# Patient Record
Sex: MALE | Race: WHITE | NOT HISPANIC OR LATINO | Employment: FULL TIME | URBAN - NONMETROPOLITAN AREA
[De-identification: names, ages, dates, MRNs, and addresses within clinical notes are randomized per-mention and may not be internally consistent; named-entity substitution may affect disease eponyms.]

---

## 2024-12-04 DIAGNOSIS — E78.00 HYPERCHOLESTEROLEMIA: ICD-10-CM

## 2024-12-04 RX ORDER — ATORVASTATIN CALCIUM 40 MG/1
40 TABLET, FILM COATED ORAL DAILY
Qty: 30 TABLET | Refills: 0 | Status: SHIPPED | OUTPATIENT
Start: 2024-12-04

## 2024-12-06 DIAGNOSIS — J44.9 CHRONIC OBSTRUCTIVE PULMONARY DISEASE, UNSPECIFIED COPD TYPE (MULTI): ICD-10-CM

## 2024-12-08 RX ORDER — ALBUTEROL SULFATE 90 UG/1
INHALANT RESPIRATORY (INHALATION)
Qty: 27 G | Refills: 0 | Status: SHIPPED | OUTPATIENT
Start: 2024-12-08

## 2024-12-10 ENCOUNTER — APPOINTMENT (OUTPATIENT)
Dept: PRIMARY CARE | Facility: CLINIC | Age: 64
End: 2024-12-10
Payer: COMMERCIAL

## 2024-12-10 VITALS
DIASTOLIC BLOOD PRESSURE: 98 MMHG | BODY MASS INDEX: 30.04 KG/M2 | OXYGEN SATURATION: 95 % | WEIGHT: 197.6 LBS | HEART RATE: 81 BPM | SYSTOLIC BLOOD PRESSURE: 160 MMHG | TEMPERATURE: 97.7 F

## 2024-12-10 DIAGNOSIS — E66.811 CLASS 1 OBESITY DUE TO EXCESS CALORIES WITH SERIOUS COMORBIDITY AND BODY MASS INDEX (BMI) OF 30.0 TO 30.9 IN ADULT: ICD-10-CM

## 2024-12-10 DIAGNOSIS — R41.3 MEMORY LOSS: ICD-10-CM

## 2024-12-10 DIAGNOSIS — E66.09 CLASS 1 OBESITY DUE TO EXCESS CALORIES WITH SERIOUS COMORBIDITY AND BODY MASS INDEX (BMI) OF 30.0 TO 30.9 IN ADULT: ICD-10-CM

## 2024-12-10 DIAGNOSIS — J44.9 CHRONIC OBSTRUCTIVE PULMONARY DISEASE, UNSPECIFIED COPD TYPE (MULTI): ICD-10-CM

## 2024-12-10 DIAGNOSIS — E11.69 TYPE 2 DIABETES MELLITUS WITH OTHER SPECIFIED COMPLICATION, WITHOUT LONG-TERM CURRENT USE OF INSULIN: ICD-10-CM

## 2024-12-10 DIAGNOSIS — R93.89 ABNORMAL CT OF THE CHEST: Primary | ICD-10-CM

## 2024-12-10 DIAGNOSIS — F17.210 CIGARETTE NICOTINE DEPENDENCE WITHOUT COMPLICATION: ICD-10-CM

## 2024-12-10 DIAGNOSIS — I10 PRIMARY HYPERTENSION: ICD-10-CM

## 2024-12-10 DIAGNOSIS — I25.10 CORONARY ARTERY DISEASE INVOLVING NATIVE CORONARY ARTERY OF NATIVE HEART WITHOUT ANGINA PECTORIS: ICD-10-CM

## 2024-12-10 PROCEDURE — 3061F NEG MICROALBUMINURIA REV: CPT | Performed by: INTERNAL MEDICINE

## 2024-12-10 PROCEDURE — 99214 OFFICE O/P EST MOD 30 MIN: CPT | Performed by: INTERNAL MEDICINE

## 2024-12-10 PROCEDURE — 3077F SYST BP >= 140 MM HG: CPT | Performed by: INTERNAL MEDICINE

## 2024-12-10 PROCEDURE — 3080F DIAST BP >= 90 MM HG: CPT | Performed by: INTERNAL MEDICINE

## 2024-12-10 PROCEDURE — 3049F LDL-C 100-129 MG/DL: CPT | Performed by: INTERNAL MEDICINE

## 2024-12-10 PROCEDURE — 3044F HG A1C LEVEL LT 7.0%: CPT | Performed by: INTERNAL MEDICINE

## 2024-12-10 RX ORDER — ALBUTEROL SULFATE 90 UG/1
INHALANT RESPIRATORY (INHALATION)
Qty: 27 G | Refills: 0 | Status: SHIPPED | OUTPATIENT
Start: 2024-12-10

## 2024-12-10 NOTE — PROGRESS NOTES
Subjective   Patient ID: Grant Allen Sr. is a 64 y.o. male who presents for Follow-up (6 weeks ).  HPI    Follow up CT chest    Abnormal CT chest  COPD / Asthma on therapy no side effects   Pulm following  On oxygen at night  2 L nc  nicotine addiction counseled      Nicotine abuse   chantix not approved     Memory loss  MMSE 24/30 mild dementia today  CT 12-23 negative  Neuro pending         shingles / PHN  Pain less off gabapentin     DM type II non-insulin-dependent no side effects  Follow blood sugars closely  HBA1C 6.5  6-24  CKD stage 3a  Eyes recommended  feet on follow up     Nasal polyps, vertigo  ENT consult / not done  Audible breathing sounds     CAD/hypertension stable on therapy no side effects   Working on bp med changes  Cardio following     diet and exercise reviewed    Review of Systems   All other systems reviewed and are negative.      Objective   BP (!) 160/98   Pulse 81   Temp 36.5 °C (97.7 °F)   Wt 89.6 kg (197 lb 9.6 oz)   SpO2 95%   BMI 30.04 kg/m²   Lab Results   Component Value Date    WBC 8.0 06/11/2024    HGB 14.0 06/11/2024    HCT 44.5 06/11/2024     06/11/2024    CHOL 179 06/11/2024    TRIG 105 06/11/2024    HDL 57.3 06/11/2024    ALT 12 06/11/2024    AST 13 06/11/2024     06/11/2024    K 4.6 06/11/2024     06/11/2024    CREATININE 1.33 (H) 06/11/2024    BUN 18 06/11/2024    CO2 27 06/11/2024    TSH 1.61 06/11/2024    PSA 0.57 06/20/2023    INR 1.0 05/21/2021    HGBA1C 6.5 (H) 06/11/2024           Physical Exam  Vitals reviewed.   Constitutional:       Appearance: Normal appearance. He is obese.   HENT:      Head: Normocephalic and atraumatic.      Mouth/Throat:      Pharynx: No posterior oropharyngeal erythema.   Eyes:      General: No scleral icterus.     Conjunctiva/sclera: Conjunctivae normal.      Pupils: Pupils are equal, round, and reactive to light.   Cardiovascular:      Rate and Rhythm: Normal rate and regular rhythm.      Heart sounds: Normal heart  sounds.   Pulmonary:      Effort: No respiratory distress.      Breath sounds: No wheezing.   Abdominal:      General: Abdomen is flat. Bowel sounds are normal. There is no distension.      Palpations: Abdomen is soft. There is no mass.      Tenderness: There is no abdominal tenderness. There is no rebound.   Musculoskeletal:         General: Normal range of motion.      Cervical back: Normal range of motion and neck supple.   Skin:     General: Skin is warm and dry.   Neurological:      General: No focal deficit present.      Mental Status: He is alert and oriented to person, place, and time. Mental status is at baseline.   Psychiatric:         Mood and Affect: Mood normal.         Behavior: Behavior normal.         Thought Content: Thought content normal.         Judgment: Judgment normal.         Problem List Items Addressed This Visit             ICD-10-CM    Nicotine addiction F17.200    COPD (chronic obstructive pulmonary disease) (Multi) J44.9    Relevant Medications    albuterol 90 mcg/actuation inhaler    CAD (coronary artery disease) I25.10    Primary hypertension I10     Other Visit Diagnoses         Codes    Abnormal CT of the chest    -  Primary R93.89    Type 2 diabetes mellitus with other specified complication, without long-term current use of insulin     E11.69    Memory loss     R41.3    Relevant Orders    Referral to Neurology    Class 1 obesity due to excess calories with serious comorbidity and body mass index (BMI) of 30.0 to 30.9 in adult     E66.811, E66.09, Z68.30          Assessment/Plan     Follow up CT chest    Abnormal CT chest  COPD / Asthma on therapy no side effects   Pulm following  On oxygen at night  2 L nc  nicotine addiction counseled      Nicotine abuse   chantix not approved     Memory loss  MMSE 24/30 mild dementia today  CT 12-23 negative  Neuro pending         shingles / PHN  Pain less off gabapentin     DM type II non-insulin-dependent no side effects  Follow blood sugars  closely  HBA1C 6.5  6-24  CKD stage 3a  Eyes recommended  feet on follow up     Nasal polyps, vertigo  ENT consult / not done  Audible breathing sounds     CAD/hypertension stable on therapy no side effects   Working on bp med changes  Better at home  Cardio following  follow     diet and exercise reviewed    Prostate 10-24  Cologuard 2-22 negative  CT chest 11-24   immunizations rev'd pneumo, Shingrix, RSV, COVID 19   / not done  BMI 30    Follow up 3 months

## 2024-12-31 DIAGNOSIS — I10 PRIMARY HYPERTENSION: ICD-10-CM

## 2025-01-02 RX ORDER — METOPROLOL SUCCINATE 50 MG/1
50 TABLET, EXTENDED RELEASE ORAL DAILY
Qty: 90 TABLET | Refills: 0 | Status: SHIPPED | OUTPATIENT
Start: 2025-01-02

## 2025-01-08 DIAGNOSIS — E78.00 HYPERCHOLESTEROLEMIA: ICD-10-CM

## 2025-01-08 RX ORDER — ATORVASTATIN CALCIUM 40 MG/1
40 TABLET, FILM COATED ORAL DAILY
Qty: 90 TABLET | Refills: 0 | Status: SHIPPED | OUTPATIENT
Start: 2025-01-08

## 2025-01-14 DIAGNOSIS — J30.9 ALLERGIC RHINITIS, UNSPECIFIED SEASONALITY, UNSPECIFIED TRIGGER: ICD-10-CM

## 2025-01-14 RX ORDER — MONTELUKAST SODIUM 10 MG/1
10 TABLET ORAL DAILY
Qty: 90 TABLET | Refills: 0 | Status: SHIPPED | OUTPATIENT
Start: 2025-01-14

## 2025-01-28 DIAGNOSIS — J44.9 CHRONIC OBSTRUCTIVE PULMONARY DISEASE, UNSPECIFIED COPD TYPE (MULTI): ICD-10-CM

## 2025-01-29 RX ORDER — ALBUTEROL SULFATE 90 UG/1
INHALANT RESPIRATORY (INHALATION)
Qty: 27 G | Refills: 0 | Status: SHIPPED | OUTPATIENT
Start: 2025-01-29

## 2025-02-01 DIAGNOSIS — F41.9 ANXIETY: ICD-10-CM

## 2025-02-03 RX ORDER — BUPROPION HYDROCHLORIDE 150 MG/1
450 TABLET ORAL DAILY
Qty: 270 TABLET | Refills: 0 | Status: SHIPPED | OUTPATIENT
Start: 2025-02-03

## 2025-02-26 DIAGNOSIS — J44.9 CHRONIC OBSTRUCTIVE PULMONARY DISEASE, UNSPECIFIED COPD TYPE (MULTI): ICD-10-CM

## 2025-02-26 RX ORDER — ALBUTEROL SULFATE 90 UG/1
INHALANT RESPIRATORY (INHALATION)
Qty: 27 G | Refills: 0 | Status: SHIPPED | OUTPATIENT
Start: 2025-02-26

## 2025-03-12 ENCOUNTER — APPOINTMENT (OUTPATIENT)
Dept: PRIMARY CARE | Facility: CLINIC | Age: 65
End: 2025-03-12
Payer: COMMERCIAL

## 2025-03-12 VITALS
HEART RATE: 83 BPM | TEMPERATURE: 97.6 F | OXYGEN SATURATION: 96 % | WEIGHT: 199.4 LBS | DIASTOLIC BLOOD PRESSURE: 86 MMHG | BODY MASS INDEX: 30.32 KG/M2 | SYSTOLIC BLOOD PRESSURE: 150 MMHG

## 2025-03-12 DIAGNOSIS — E11.69 TYPE 2 DIABETES MELLITUS WITH OBESITY (MULTI): Primary | ICD-10-CM

## 2025-03-12 DIAGNOSIS — E66.09 CLASS 1 OBESITY DUE TO EXCESS CALORIES WITH SERIOUS COMORBIDITY AND BODY MASS INDEX (BMI) OF 30.0 TO 30.9 IN ADULT: ICD-10-CM

## 2025-03-12 DIAGNOSIS — E66.9 TYPE 2 DIABETES MELLITUS WITH OBESITY (MULTI): Primary | ICD-10-CM

## 2025-03-12 DIAGNOSIS — I10 PRIMARY HYPERTENSION: ICD-10-CM

## 2025-03-12 DIAGNOSIS — F17.210 CIGARETTE NICOTINE DEPENDENCE WITHOUT COMPLICATION: ICD-10-CM

## 2025-03-12 DIAGNOSIS — I42.8 OTHER CARDIOMYOPATHIES: ICD-10-CM

## 2025-03-12 DIAGNOSIS — E66.811 CLASS 1 OBESITY DUE TO EXCESS CALORIES WITH SERIOUS COMORBIDITY AND BODY MASS INDEX (BMI) OF 30.0 TO 30.9 IN ADULT: ICD-10-CM

## 2025-03-12 DIAGNOSIS — I25.10 CORONARY ARTERY DISEASE INVOLVING NATIVE CORONARY ARTERY OF NATIVE HEART WITHOUT ANGINA PECTORIS: ICD-10-CM

## 2025-03-12 DIAGNOSIS — Z12.11 ENCOUNTER FOR SCREENING FOR MALIGNANT NEOPLASM OF COLON: ICD-10-CM

## 2025-03-12 DIAGNOSIS — J44.9 CHRONIC OBSTRUCTIVE PULMONARY DISEASE, UNSPECIFIED COPD TYPE (MULTI): ICD-10-CM

## 2025-03-12 PROCEDURE — 99214 OFFICE O/P EST MOD 30 MIN: CPT | Performed by: INTERNAL MEDICINE

## 2025-03-12 PROCEDURE — 3079F DIAST BP 80-89 MM HG: CPT | Performed by: INTERNAL MEDICINE

## 2025-03-12 PROCEDURE — 3077F SYST BP >= 140 MM HG: CPT | Performed by: INTERNAL MEDICINE

## 2025-03-12 RX ORDER — ALBUTEROL SULFATE 90 UG/1
INHALANT RESPIRATORY (INHALATION)
Qty: 27 G | Refills: 0 | Status: SHIPPED | OUTPATIENT
Start: 2025-03-12

## 2025-03-12 NOTE — PROGRESS NOTES
Subjective   Patient ID: Grant Allen Sr. is a 64 y.o. male who presents for Follow-up (3 month ).  HPI    Follow up    Excessive eating no weight gain  No other sx  Check labs     Abnormal CT chest  COPD / Asthma on therapy no side effects   Pulm following  On oxygen at night  2 L nc  nicotine addiction counseled      Nicotine abuse   chantix not approved     Memory loss  MMSE 24/30 mild dementia today  CT 12-23 negative  Neuro pending         shingles / PHN  Pain less off gabapentin     DM type II non-insulin-dependent no side effects  Follow blood sugars closely  HBA1C 6.5  6-24  CKD stage 3a  Eyes recommended  feet on follow up     Nasal polyps, vertigo  ENT consult / not done  Audible breathing sounds     CAD/hypertension stable on therapy no side effects   Working on bp med changes  Better at home  Cardio following  follow     diet and exercise reviewed    Review of Systems   All other systems reviewed and are negative.      Objective   /86   Pulse 83   Temp 36.4 °C (97.6 °F)   Wt 90.4 kg (199 lb 6.4 oz)   SpO2 96%   BMI 30.32 kg/m²   Lab Results   Component Value Date    WBC 8.0 06/11/2024    HGB 14.0 06/11/2024    HCT 44.5 06/11/2024     06/11/2024    CHOL 179 06/11/2024    TRIG 105 06/11/2024    HDL 57.3 06/11/2024    ALT 12 06/11/2024    AST 13 06/11/2024     06/11/2024    K 4.6 06/11/2024     06/11/2024    CREATININE 1.33 (H) 06/11/2024    BUN 18 06/11/2024    CO2 27 06/11/2024    TSH 1.61 06/11/2024    PSA 0.57 06/20/2023    INR 1.0 05/21/2021    HGBA1C 6.5 (H) 06/11/2024           Physical Exam  Vitals reviewed.   Constitutional:       Appearance: Normal appearance. He is obese.   HENT:      Head: Normocephalic and atraumatic.      Mouth/Throat:      Pharynx: No posterior oropharyngeal erythema.   Eyes:      General: No scleral icterus.     Conjunctiva/sclera: Conjunctivae normal.      Pupils: Pupils are equal, round, and reactive to light.   Cardiovascular:      Rate and  Rhythm: Normal rate and regular rhythm.      Heart sounds: Normal heart sounds.   Pulmonary:      Effort: No respiratory distress.      Breath sounds: No wheezing.   Abdominal:      General: Abdomen is flat. Bowel sounds are normal. There is no distension.      Palpations: Abdomen is soft. There is no mass.      Tenderness: There is no abdominal tenderness. There is no rebound.   Musculoskeletal:         General: Normal range of motion.      Cervical back: Normal range of motion and neck supple.   Skin:     General: Skin is warm and dry.   Neurological:      General: No focal deficit present.      Mental Status: He is alert and oriented to person, place, and time. Mental status is at baseline.   Psychiatric:         Mood and Affect: Mood normal.         Behavior: Behavior normal.         Thought Content: Thought content normal.         Judgment: Judgment normal.         Problem List Items Addressed This Visit             ICD-10-CM    Nicotine addiction F17.200    COPD (chronic obstructive pulmonary disease) (Multi) J44.9    Relevant Medications    albuterol 90 mcg/actuation inhaler    CAD (coronary artery disease) I25.10    Primary hypertension I10    Other cardiomyopathies I42.8     Other Visit Diagnoses         Codes    Type 2 diabetes mellitus with obesity (Multi)    -  Primary E11.69, E66.9    Relevant Orders    Comprehensive Metabolic Panel    Hemoglobin A1C    CBC and Auto Differential    TSH with reflex to Free T4 if abnormal    Class 1 obesity due to excess calories with serious comorbidity and body mass index (BMI) of 30.0 to 30.9 in adult     E66.811, E66.09, Z68.30    Encounter for screening for malignant neoplasm of colon     Z12.11    Relevant Orders    Cologuard® colon cancer screening          Assessment/Plan     Excessive eating no weight gain  No other sx  Check labs     Abnormal CT chest  COPD / Asthma on therapy no side effects   Pulm following  On oxygen at night  2 L nc  nicotine addiction  counseled      Nicotine abuse   chantix not approved     Memory loss  MMSE 24/30 mild dementia today  CT 12-23 negative  Neuro pending         shingles / PHN  Pain less off gabapentin     DM type II non-insulin-dependent no side effects  Follow blood sugars closely  HBA1C 6.5  6-24  CKD stage 3a  Eyes recommended  feet on follow up     Nasal polyps, vertigo  ENT consult / not done  Audible breathing sounds     CAD/hypertension stable on therapy no side effects   Working on bp med changes  Better at home  Cardio following  follow     diet and exercise reviewed    Prostate 10-24  Cologuard 2-22 negative  CT chest 11-24   immunizations rev'd pneumo, Shingrix, RSV, COVID 19   / not done  BMI 30.3    Check labs, cologuard    Follow up 3 weeks

## 2025-03-13 LAB
ALBUMIN SERPL-MCNC: 4.4 G/DL (ref 3.6–5.1)
ALBUMIN/GLOB SERPL: 1.9 (CALC) (ref 1–2.5)
ALP SERPL-CCNC: 73 U/L (ref 35–144)
ALT SERPL-CCNC: 14 U/L (ref 9–46)
AST SERPL-CCNC: 13 U/L (ref 10–35)
BASOPHILS # BLD AUTO: 78 CELLS/UL (ref 0–200)
BASOPHILS NFR BLD AUTO: 0.8 %
BILIRUB SERPL-MCNC: 0.4 MG/DL (ref 0.2–1.2)
BUN SERPL-MCNC: 25 MG/DL (ref 7–25)
BUN/CREAT SERPL: 16 (CALC) (ref 6–22)
CALCIUM SERPL-MCNC: 9.7 MG/DL (ref 8.6–10.3)
CHLORIDE SERPL-SCNC: 104 MMOL/L (ref 98–110)
CO2 SERPL-SCNC: 28 MMOL/L (ref 20–32)
CREAT SERPL-MCNC: 1.52 MG/DL (ref 0.7–1.35)
EGFRCR SERPLBLD CKD-EPI 2021: 51 ML/MIN/1.73M2
EOSINOPHIL # BLD AUTO: 815 CELLS/UL (ref 15–500)
EOSINOPHIL NFR BLD AUTO: 8.4 %
ERYTHROCYTE [DISTWIDTH] IN BLOOD BY AUTOMATED COUNT: 12.4 % (ref 11–15)
GLOBULIN SER CALC-MCNC: 2.3 G/DL (CALC) (ref 1.9–3.7)
GLUCOSE SERPL-MCNC: 168 MG/DL (ref 65–139)
HBA1C MFR BLD: 7 % OF TOTAL HGB
HCT VFR BLD AUTO: 44.9 % (ref 38.5–50)
HGB BLD-MCNC: 14.5 G/DL (ref 13.2–17.1)
LYMPHOCYTES # BLD AUTO: 2629 CELLS/UL (ref 850–3900)
LYMPHOCYTES NFR BLD AUTO: 27.1 %
MCH RBC QN AUTO: 28.8 PG (ref 27–33)
MCHC RBC AUTO-ENTMCNC: 32.3 G/DL (ref 32–36)
MCV RBC AUTO: 89.3 FL (ref 80–100)
MONOCYTES # BLD AUTO: 1183 CELLS/UL (ref 200–950)
MONOCYTES NFR BLD AUTO: 12.2 %
NEUTROPHILS # BLD AUTO: 4996 CELLS/UL (ref 1500–7800)
NEUTROPHILS NFR BLD AUTO: 51.5 %
PLATELET # BLD AUTO: 369 THOUSAND/UL (ref 140–400)
PMV BLD REES-ECKER: 11 FL (ref 7.5–12.5)
POTASSIUM SERPL-SCNC: 4.9 MMOL/L (ref 3.5–5.3)
PROT SERPL-MCNC: 6.7 G/DL (ref 6.1–8.1)
RBC # BLD AUTO: 5.03 MILLION/UL (ref 4.2–5.8)
SODIUM SERPL-SCNC: 139 MMOL/L (ref 135–146)
TSH SERPL-ACNC: 1.59 MIU/L (ref 0.4–4.5)
WBC # BLD AUTO: 9.7 THOUSAND/UL (ref 3.8–10.8)

## 2025-03-30 LAB — NONINV COLON CA DNA+OCC BLD SCRN STL QL: NEGATIVE

## 2025-04-01 DIAGNOSIS — I10 PRIMARY HYPERTENSION: ICD-10-CM

## 2025-04-01 RX ORDER — METOPROLOL SUCCINATE 50 MG/1
50 TABLET, EXTENDED RELEASE ORAL DAILY
Qty: 30 TABLET | Refills: 0 | Status: SHIPPED | OUTPATIENT
Start: 2025-04-01

## 2025-04-03 ENCOUNTER — APPOINTMENT (OUTPATIENT)
Dept: PRIMARY CARE | Facility: CLINIC | Age: 65
End: 2025-04-03
Payer: COMMERCIAL

## 2025-04-03 VITALS
TEMPERATURE: 98.5 F | WEIGHT: 200.6 LBS | HEART RATE: 76 BPM | OXYGEN SATURATION: 95 % | DIASTOLIC BLOOD PRESSURE: 76 MMHG | SYSTOLIC BLOOD PRESSURE: 148 MMHG | BODY MASS INDEX: 30.5 KG/M2

## 2025-04-03 DIAGNOSIS — F17.210 CIGARETTE NICOTINE DEPENDENCE WITHOUT COMPLICATION: ICD-10-CM

## 2025-04-03 DIAGNOSIS — R06.83 SNORING: Primary | ICD-10-CM

## 2025-04-03 DIAGNOSIS — J44.9 CHRONIC OBSTRUCTIVE PULMONARY DISEASE, UNSPECIFIED COPD TYPE (MULTI): ICD-10-CM

## 2025-04-03 DIAGNOSIS — E11.69 TYPE 2 DIABETES MELLITUS WITH OBESITY (MULTI): ICD-10-CM

## 2025-04-03 DIAGNOSIS — R53.83 OTHER FATIGUE: ICD-10-CM

## 2025-04-03 DIAGNOSIS — I25.10 CORONARY ARTERY DISEASE INVOLVING NATIVE CORONARY ARTERY OF NATIVE HEART WITHOUT ANGINA PECTORIS: ICD-10-CM

## 2025-04-03 DIAGNOSIS — E66.9 TYPE 2 DIABETES MELLITUS WITH OBESITY (MULTI): ICD-10-CM

## 2025-04-03 DIAGNOSIS — E66.09 CLASS 1 OBESITY DUE TO EXCESS CALORIES WITH SERIOUS COMORBIDITY AND BODY MASS INDEX (BMI) OF 30.0 TO 30.9 IN ADULT: ICD-10-CM

## 2025-04-03 DIAGNOSIS — I10 PRIMARY HYPERTENSION: ICD-10-CM

## 2025-04-03 DIAGNOSIS — E66.811 CLASS 1 OBESITY DUE TO EXCESS CALORIES WITH SERIOUS COMORBIDITY AND BODY MASS INDEX (BMI) OF 30.0 TO 30.9 IN ADULT: ICD-10-CM

## 2025-04-03 PROCEDURE — 99214 OFFICE O/P EST MOD 30 MIN: CPT | Performed by: INTERNAL MEDICINE

## 2025-04-03 PROCEDURE — 3077F SYST BP >= 140 MM HG: CPT | Performed by: INTERNAL MEDICINE

## 2025-04-03 PROCEDURE — 3078F DIAST BP <80 MM HG: CPT | Performed by: INTERNAL MEDICINE

## 2025-04-03 NOTE — PROGRESS NOTES
Subjective   Patient ID: Grant Allen Sr. is a 64 y.o. male who presents for 3 week follow up.  HPI    Follow up tests    Labs rev'd    Excessive eating no weight gain, fatigue  No other sx  Eat 3 meals instead of 1 a day    Fatigue and snoring  Check sleep consult  R/O AMADEO     Abnormal CT chest  COPD / Asthma on therapy no side effects   Pulm following  On oxygen at night  2 L nc  nicotine addiction counseled      Nicotine abuse   chantix not approved     Memory loss  MMSE 24/30 mild dementia today  CT 12-23 negative  Neuro pending         shingles / PHN  Pain less off gabapentin     DM type II non-insulin-dependent no side effects  Follow blood sugars closely  HBA1C 6.5  6-24  CKD stage 3a  Eyes recommended  feet on follow up     Nasal polyps, vertigo  ENT consult / not done  Audible breathing sounds     CAD/hypertension stable on therapy no side effects   Working on bp med changes  Better at home  Cardio following  follow     diet and exercise reviewed    Review of Systems   All other systems reviewed and are negative.      Objective   /76   Pulse 76   Temp 36.9 °C (98.5 °F)   Wt 91 kg (200 lb 9.6 oz)   SpO2 95%   BMI 30.50 kg/m²   Lab Results   Component Value Date    WBC 9.7 03/12/2025    HGB 14.5 03/12/2025    HCT 44.9 03/12/2025     03/12/2025    CHOL 179 06/11/2024    TRIG 105 06/11/2024    HDL 57.3 06/11/2024    ALT 14 03/12/2025    AST 13 03/12/2025     03/12/2025    K 4.9 03/12/2025     03/12/2025    CREATININE 1.52 (H) 03/12/2025    BUN 25 03/12/2025    CO2 28 03/12/2025    TSH 1.59 03/12/2025    PSA 0.57 06/20/2023    INR 1.0 05/21/2021    HGBA1C 7.0 (H) 03/12/2025           Physical Exam  Vitals reviewed.   Constitutional:       Appearance: Normal appearance. He is obese.   HENT:      Head: Normocephalic and atraumatic.      Mouth/Throat:      Pharynx: No posterior oropharyngeal erythema.   Eyes:      General: No scleral icterus.     Conjunctiva/sclera: Conjunctivae  normal.      Pupils: Pupils are equal, round, and reactive to light.   Cardiovascular:      Rate and Rhythm: Normal rate and regular rhythm.      Heart sounds: Normal heart sounds.   Pulmonary:      Effort: No respiratory distress.      Breath sounds: No wheezing.   Abdominal:      General: Abdomen is flat. Bowel sounds are normal. There is no distension.      Palpations: Abdomen is soft. There is no mass.      Tenderness: There is no abdominal tenderness. There is no rebound.   Musculoskeletal:         General: Normal range of motion.      Cervical back: Normal range of motion and neck supple.   Skin:     General: Skin is warm and dry.   Neurological:      General: No focal deficit present.      Mental Status: He is alert and oriented to person, place, and time. Mental status is at baseline.   Psychiatric:         Mood and Affect: Mood normal.         Behavior: Behavior normal.         Thought Content: Thought content normal.         Judgment: Judgment normal.         Problem List Items Addressed This Visit             ICD-10-CM    Fatigue R53.83    Relevant Orders    Referral to Adult Sleep Medicine    Nicotine addiction F17.200    COPD (chronic obstructive pulmonary disease) (Multi) J44.9    CAD (coronary artery disease) I25.10    Primary hypertension I10     Other Visit Diagnoses         Codes    Snoring    -  Primary R06.83    Relevant Orders    Referral to Adult Sleep Medicine    Type 2 diabetes mellitus with obesity (Multi)     E11.69, E66.9    Class 1 obesity due to excess calories with serious comorbidity and body mass index (BMI) of 30.0 to 30.9 in adult     E66.811, E66.09, Z68.30          Assessment/Plan     Follow up tests    Labs rev'd    Excessive eating no weight gain, fatigue  No other sx  Eat 3 meals instead of 1 a day    Fatigue and snoring  Check sleep consult  R/O AMADEO     Abnormal CT chest  COPD / Asthma on therapy no side effects   Pulm following  On oxygen at night  2 L nc  nicotine addiction  counseled      Nicotine abuse   chantix not approved     Memory loss  MMSE 24/30 mild dementia today  CT 12-23 negative  Neuro pending         shingles / PHN  Pain less off gabapentin     DM type II non-insulin-dependent no side effects  Follow blood sugars closely  HBA1C 6.5  6-24  CKD stage 3a  Eyes recommended  feet on follow up     Nasal polyps, vertigo  ENT consult / not done  Audible breathing sounds     CAD/hypertension stable on therapy no side effects   Working on bp med changes  Better at home  Cardio following  follow     diet and exercise reviewed    Prostate 10-24  Cologuard 2-22 negative  CT chest 11-24   immunizations rev'd pneumo, Shingrix, RSV, COVID 19   / not done  BMI 30.5    Follow up 2 months / yearly physical

## 2025-04-09 DIAGNOSIS — E78.00 HYPERCHOLESTEROLEMIA: ICD-10-CM

## 2025-04-09 RX ORDER — ATORVASTATIN CALCIUM 40 MG/1
40 TABLET, FILM COATED ORAL DAILY
Qty: 90 TABLET | Refills: 0 | Status: SHIPPED | OUTPATIENT
Start: 2025-04-09

## 2025-04-16 DIAGNOSIS — J30.9 ALLERGIC RHINITIS, UNSPECIFIED SEASONALITY, UNSPECIFIED TRIGGER: ICD-10-CM

## 2025-04-16 RX ORDER — MONTELUKAST SODIUM 10 MG/1
10 TABLET ORAL DAILY
Qty: 90 TABLET | Refills: 0 | Status: SHIPPED | OUTPATIENT
Start: 2025-04-16

## 2025-04-29 DIAGNOSIS — F41.9 ANXIETY: ICD-10-CM

## 2025-04-29 RX ORDER — BUPROPION HYDROCHLORIDE 150 MG/1
450 TABLET ORAL DAILY
Qty: 270 TABLET | Refills: 0 | Status: SHIPPED | OUTPATIENT
Start: 2025-04-29

## 2025-05-02 DIAGNOSIS — I10 PRIMARY HYPERTENSION: ICD-10-CM

## 2025-05-04 RX ORDER — METOPROLOL SUCCINATE 50 MG/1
50 TABLET, EXTENDED RELEASE ORAL DAILY
Qty: 90 TABLET | Refills: 0 | Status: SHIPPED | OUTPATIENT
Start: 2025-05-04

## 2025-05-12 DIAGNOSIS — E11.69 TYPE 2 DIABETES MELLITUS WITH OTHER SPECIFIED COMPLICATION, WITHOUT LONG-TERM CURRENT USE OF INSULIN: ICD-10-CM

## 2025-05-12 RX ORDER — GLIMEPIRIDE 2 MG/1
4 TABLET ORAL DAILY
Qty: 180 TABLET | Refills: 0 | Status: SHIPPED | OUTPATIENT
Start: 2025-05-12

## 2025-05-30 DIAGNOSIS — J44.9 CHRONIC OBSTRUCTIVE PULMONARY DISEASE, UNSPECIFIED COPD TYPE (MULTI): ICD-10-CM

## 2025-06-02 RX ORDER — ALBUTEROL SULFATE 90 UG/1
INHALANT RESPIRATORY (INHALATION)
Qty: 27 G | Refills: 0 | Status: SHIPPED | OUTPATIENT
Start: 2025-06-02

## 2025-06-18 ENCOUNTER — APPOINTMENT (OUTPATIENT)
Dept: PULMONOLOGY | Facility: CLINIC | Age: 65
End: 2025-06-18
Payer: COMMERCIAL

## 2025-06-23 ENCOUNTER — APPOINTMENT (OUTPATIENT)
Dept: PRIMARY CARE | Facility: CLINIC | Age: 65
End: 2025-06-23
Payer: COMMERCIAL

## 2025-07-07 DIAGNOSIS — E78.00 HYPERCHOLESTEROLEMIA: ICD-10-CM

## 2025-07-07 RX ORDER — ATORVASTATIN CALCIUM 40 MG/1
40 TABLET, FILM COATED ORAL DAILY
Qty: 30 TABLET | Refills: 0 | Status: SHIPPED | OUTPATIENT
Start: 2025-07-07

## 2025-07-22 DIAGNOSIS — J44.9 CHRONIC OBSTRUCTIVE PULMONARY DISEASE, UNSPECIFIED COPD TYPE (MULTI): ICD-10-CM

## 2025-07-22 RX ORDER — ALBUTEROL SULFATE 90 UG/1
INHALANT RESPIRATORY (INHALATION)
Qty: 18 G | Refills: 0 | Status: SHIPPED | OUTPATIENT
Start: 2025-07-22